# Patient Record
(demographics unavailable — no encounter records)

---

## 2025-02-03 NOTE — REVIEW OF SYSTEMS
[Dyspnea on exertion] : dyspnea during exertion [Joint Pain] : joint pain [Joint Stiffness] : joint stiffness [Negative] : Heme/Lymph [Weight Gain (___ Lbs)] : no recent weight gain [Weight Loss (___ Lbs)] : no recent weight loss [SOB] : no shortness of breath [Chest Discomfort] : no chest discomfort [Lower Ext Edema] : no extremity edema [Orthopnea] : no orthopnea [Numbness (Hypoesthesia)] : no numbness [Weakness] : no weakness [Speech Disturbance] : no speech disturbance [de-identified] : lightheaded

## 2025-02-03 NOTE — PHYSICAL EXAM
[Normal Conjunctiva] : the conjunctiva exhibited no abnormalities [Eyelids - No Xanthelasma] : the eyelids demonstrated no xanthelasmas [Normal Oral Mucosa] : normal oral mucosa [No Oral Pallor] : no oral pallor [No Oral Cyanosis] : no oral cyanosis [Normal Jugular Venous A Waves Present] : normal jugular venous A waves present [Normal Jugular Venous V Waves Present] : normal jugular venous V waves present [No Jugular Venous Hutchinson A Waves] : no jugular venous hutchinson A waves [Respiration, Rhythm And Depth] : normal respiratory rhythm and effort [Exaggerated Use Of Accessory Muscles For Inspiration] : no accessory muscle use [Auscultation Breath Sounds / Voice Sounds] : lungs were clear to auscultation bilaterally [Abdomen Soft] : soft [Abdomen Tenderness] : non-tender [Abdomen Mass (___ Cm)] : no abdominal mass palpated [Abnormal Walk] : normal gait [Nail Clubbing] : no clubbing of the fingernails [Cyanosis, Localized] : no localized cyanosis [Petechial Hemorrhages (___cm)] : no petechial hemorrhages [] : no ischemic changes [Oriented To Time, Place, And Person] : oriented to person, place, and time [Affect] : the affect was normal [Mood] : the mood was normal [No Anxiety] : not feeling anxious [FreeTextEntry1] : Cardiac: Nl S1 S2 with a grade 1/6  PATTI and no significant  gallups or rubs.
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
## 2025-02-03 NOTE — REVIEW OF SYSTEMS
[Dyspnea on exertion] : dyspnea during exertion [Joint Pain] : joint pain [Joint Stiffness] : joint stiffness [Negative] : Heme/Lymph [Weight Gain (___ Lbs)] : no recent weight gain [Weight Loss (___ Lbs)] : no recent weight loss [SOB] : no shortness of breath [Chest Discomfort] : no chest discomfort [Lower Ext Edema] : no extremity edema [Orthopnea] : no orthopnea [Numbness (Hypoesthesia)] : no numbness [Weakness] : no weakness [Speech Disturbance] : no speech disturbance [de-identified] : lightheaded
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
## 2025-03-07 NOTE — RESULTS/DATA
[TextEntry] : PSG from 7/16/15 revealed mild JOSEPH with an AHI of 12.5. PSG with oral appliance from 11/10/15 revealed borderline JOSEPH with an AHI of 5. S/N PSG from 12/21/17 revealed moderate JOSEPH with an AHI of 18.3 with improvement to 6.7 with the oral appliance. Home PSG from 10/24/24 revealed mild JOSEPH with an AHI of 13.2 though unclear if she was using her MAD as it apparently fell out. CXR from 2/25/25 was clear with unchanged apical scarring.

## 2025-03-07 NOTE — DISCUSSION/SUMMARY
[FreeTextEntry1] : #1. Spirometry performed previously was essentially normal without evidence of COPD #2. The patient does not appear to require BD therapy at this time #3. The patient is treated fairly well with her oral appliance but would recommend dental f/u for new MAD as her current one is no longer working properly. #4. Home PSG with mild JOSEPH with an AHI of 13.2 but unclear if she was using it. #5. Neurology evaluation given poor memory and for adjustments in her anti-depression medications; she appeared to do well with Zoloft and Trazadone in the past #6. Consider CPAP study and therapy with CPAP if oral appliance is inadequate though she did not tolerate PAP therapy in the past. She will obtain dental f/u for MAD therapy. #7. F/u in 2 after dental evaluation for MAD therapy, when she needs another HST with MAD in place or can f/u in one year.  The patient expressed understanding and agreement with the above recommendations/plan and accepts responsibility to be compliant with recommended testing, therapies, and f/u visits. All relevant questions and concerns were addressed.

## 2025-03-07 NOTE — END OF VISIT
[Time Spent: ___ minutes] : I have spent [unfilled] minutes of time on the encounter which excludes teaching and separately reported services. [TextEntry] : Discussed with pt at length regarding JOSEPH, prior smoking hx, prior Covid infection; reviewed w/u with pt as above.

## 2025-03-07 NOTE — PHYSICAL EXAM
[No Acute Distress] : no acute distress [Low Lying Soft Palate] : low lying soft palate [Elongated Uvula] : elongated uvula [Enlarged Base of the Tongue] : enlarged base of the tongue [II] : Mallampati Class: II [Normal Appearance] : normal appearance [Supple] : supple [Normal Rate/Rhythm] : normal rate/rhythm [Normal S1, S2] : normal s1, s2 [No Murmurs] : no murmurs [No Resp Distress] : no resp distress [No Acc Muscle Use] : no acc muscle use [Normal Rhythm and Effort] : normal rhythm and effort [Clear to Auscultation Bilaterally] : clear to auscultation bilaterally [No Abnormalities] : no abnormalities [Benign] : benign [Not Tender] : not tender [Soft] : soft [No Clubbing] : no clubbing [No Edema] : no edema [No Focal Deficits] : no focal deficits [Oriented x3] : oriented x3 [TextBox_11] : mild oropharyngeal crowding.

## 2025-03-07 NOTE — CONSULT LETTER
[Dear  ___] : Dear  [unfilled], [Consult Letter:] : I had the pleasure of evaluating your patient, [unfilled]. [Please see my note below.] : Please see my note below. [Consult Closing:] : Thank you very much for allowing me to participate in the care of this patient.  If you have any questions, please do not hesitate to contact me. [Sincerely,] : Sincerely, [FreeTextEntry3] : Jose Smiley MD, FCCP, D. [facility] [place]

## 2025-03-07 NOTE — HISTORY OF PRESENT ILLNESS
[Follow-Up - Routine Clinic] : a routine clinic follow-up of [Excessive Daytime Sleepiness] : excessive daytime sleepiness [Unrefreshing Sleep] : unrefreshing sleep [Sleepy When Sedentary] : sleepy when sedentary [Currently Experiencing] : The patient is currently experiencing symptoms. [None] : No associated symptoms are reported [Oral Appliance] : oral appliance [Good Compliance] : good compliance with treatment [Good Tolerance] : good tolerance of treatment [Fair Symptom Control] : fair symptom control [Snoring] : no snoring [TextBox_4] : Former smoker 1 ppd x 10 years, quit 1979. S/p multiple Covid infections. H/o AF and is being followed by cardiology.  Has h/o JOSEPH and poor memory. She reports that she was doing well with her oral appliance and combination of Trazadone and Zoloft but has decreased these medications and now reports poor sleep and loss of memory as well as awareness. She feels that she does not sleep well at night and is concerned that this may be the cause of her daytime symptoms. Uses TMJ appliance during the day and MAD at night but needs a new one. She will f/u with dentist for new MAD therapy. Pt last seen > 6 years for JOSEPH until recently.